# Patient Record
Sex: MALE | Race: WHITE
[De-identification: names, ages, dates, MRNs, and addresses within clinical notes are randomized per-mention and may not be internally consistent; named-entity substitution may affect disease eponyms.]

---

## 2019-12-12 ENCOUNTER — HOSPITAL ENCOUNTER (OUTPATIENT)
Dept: HOSPITAL 47 - EC | Age: 74
Setting detail: OBSERVATION
LOS: 1 days | Discharge: HOME | End: 2019-12-13
Attending: HOSPITALIST | Admitting: HOSPITALIST
Payer: OTHER GOVERNMENT

## 2019-12-12 VITALS — RESPIRATION RATE: 16 BRPM

## 2019-12-12 DIAGNOSIS — Z87.81: ICD-10-CM

## 2019-12-12 DIAGNOSIS — Z79.82: ICD-10-CM

## 2019-12-12 DIAGNOSIS — R53.1: Primary | ICD-10-CM

## 2019-12-12 DIAGNOSIS — I45.10: ICD-10-CM

## 2019-12-12 DIAGNOSIS — R29.700: ICD-10-CM

## 2019-12-12 DIAGNOSIS — R29.810: ICD-10-CM

## 2019-12-12 DIAGNOSIS — Z86.73: ICD-10-CM

## 2019-12-12 DIAGNOSIS — I10: ICD-10-CM

## 2019-12-12 DIAGNOSIS — E78.5: ICD-10-CM

## 2019-12-12 DIAGNOSIS — R47.81: ICD-10-CM

## 2019-12-12 DIAGNOSIS — Z87.891: ICD-10-CM

## 2019-12-12 DIAGNOSIS — Z79.899: ICD-10-CM

## 2019-12-12 DIAGNOSIS — E78.00: ICD-10-CM

## 2019-12-12 LAB
ALBUMIN SERPL-MCNC: 4.2 G/DL (ref 3.5–5)
ALP SERPL-CCNC: 69 U/L (ref 38–126)
ALT SERPL-CCNC: 32 U/L (ref 21–72)
ANION GAP SERPL CALC-SCNC: 6 MMOL/L
APTT BLD: 26.7 SEC (ref 22–30)
AST SERPL-CCNC: 29 U/L (ref 17–59)
BASOPHILS # BLD AUTO: 0 K/UL (ref 0–0.2)
BASOPHILS NFR BLD AUTO: 1 %
BUN SERPL-SCNC: 20 MG/DL (ref 9–20)
CALCIUM SPEC-MCNC: 9.2 MG/DL (ref 8.4–10.2)
CHLORIDE SERPL-SCNC: 107 MMOL/L (ref 98–107)
CO2 SERPL-SCNC: 27 MMOL/L (ref 22–30)
EOSINOPHIL # BLD AUTO: 0.3 K/UL (ref 0–0.7)
EOSINOPHIL NFR BLD AUTO: 4 %
ERYTHROCYTE [DISTWIDTH] IN BLOOD BY AUTOMATED COUNT: 4.76 M/UL (ref 4.3–5.9)
ERYTHROCYTE [DISTWIDTH] IN BLOOD: 12.8 % (ref 11.5–15.5)
GLUCOSE SERPL-MCNC: 116 MG/DL (ref 74–99)
HBA1C MFR BLD: 5.6 % (ref 4–6)
HCT VFR BLD AUTO: 44.3 % (ref 39–53)
HGB BLD-MCNC: 15 GM/DL (ref 13–17.5)
INR PPP: 1 (ref ?–1.2)
LYMPHOCYTES # SPEC AUTO: 1.9 K/UL (ref 1–4.8)
LYMPHOCYTES NFR SPEC AUTO: 32 %
MCH RBC QN AUTO: 31.5 PG (ref 25–35)
MCHC RBC AUTO-ENTMCNC: 33.8 G/DL (ref 31–37)
MCV RBC AUTO: 93.2 FL (ref 80–100)
MONOCYTES # BLD AUTO: 0.5 K/UL (ref 0–1)
MONOCYTES NFR BLD AUTO: 9 %
NEUTROPHILS # BLD AUTO: 3.1 K/UL (ref 1.3–7.7)
NEUTROPHILS NFR BLD AUTO: 51 %
PLATELET # BLD AUTO: 209 K/UL (ref 150–450)
POTASSIUM SERPL-SCNC: 4.2 MMOL/L (ref 3.5–5.1)
PROT SERPL-MCNC: 8 G/DL (ref 6.3–8.2)
PT BLD: 11 SEC (ref 9–12)
SODIUM SERPL-SCNC: 140 MMOL/L (ref 137–145)
WBC # BLD AUTO: 6 K/UL (ref 3.8–10.6)

## 2019-12-12 PROCEDURE — 85025 COMPLETE CBC W/AUTO DIFF WBC: CPT

## 2019-12-12 PROCEDURE — 85610 PROTHROMBIN TIME: CPT

## 2019-12-12 PROCEDURE — 70496 CT ANGIOGRAPHY HEAD: CPT

## 2019-12-12 PROCEDURE — 93306 TTE W/DOPPLER COMPLETE: CPT

## 2019-12-12 PROCEDURE — 80061 LIPID PANEL: CPT

## 2019-12-12 PROCEDURE — 97161 PT EVAL LOW COMPLEX 20 MIN: CPT

## 2019-12-12 PROCEDURE — 99285 EMERGENCY DEPT VISIT HI MDM: CPT

## 2019-12-12 PROCEDURE — 70498 CT ANGIOGRAPHY NECK: CPT

## 2019-12-12 PROCEDURE — 80053 COMPREHEN METABOLIC PANEL: CPT

## 2019-12-12 PROCEDURE — 96360 HYDRATION IV INFUSION INIT: CPT

## 2019-12-12 PROCEDURE — 36415 COLL VENOUS BLD VENIPUNCTURE: CPT

## 2019-12-12 PROCEDURE — 71046 X-RAY EXAM CHEST 2 VIEWS: CPT

## 2019-12-12 PROCEDURE — 70450 CT HEAD/BRAIN W/O DYE: CPT

## 2019-12-12 PROCEDURE — 93005 ELECTROCARDIOGRAM TRACING: CPT

## 2019-12-12 PROCEDURE — 97165 OT EVAL LOW COMPLEX 30 MIN: CPT

## 2019-12-12 PROCEDURE — 83036 HEMOGLOBIN GLYCOSYLATED A1C: CPT

## 2019-12-12 PROCEDURE — 85730 THROMBOPLASTIN TIME PARTIAL: CPT

## 2019-12-12 PROCEDURE — 84484 ASSAY OF TROPONIN QUANT: CPT

## 2019-12-12 PROCEDURE — 70551 MRI BRAIN STEM W/O DYE: CPT

## 2019-12-12 PROCEDURE — 92523 SPEECH SOUND LANG COMPREHEN: CPT

## 2019-12-12 PROCEDURE — 96361 HYDRATE IV INFUSION ADD-ON: CPT

## 2019-12-12 RX ADMIN — FAMOTIDINE SCH: 20 TABLET, FILM COATED ORAL at 10:10

## 2019-12-12 RX ADMIN — DOCUSATE SODIUM SCH: 100 CAPSULE, LIQUID FILLED ORAL at 16:25

## 2019-12-12 RX ADMIN — CEFAZOLIN SCH: 330 INJECTION, POWDER, FOR SOLUTION INTRAMUSCULAR; INTRAVENOUS at 15:41

## 2019-12-12 RX ADMIN — FAMOTIDINE SCH: 20 TABLET, FILM COATED ORAL at 20:04

## 2019-12-12 RX ADMIN — DOCUSATE SODIUM SCH: 100 CAPSULE, LIQUID FILLED ORAL at 10:10

## 2019-12-12 RX ADMIN — DOCUSATE SODIUM SCH: 100 CAPSULE, LIQUID FILLED ORAL at 23:55

## 2019-12-12 RX ADMIN — CEFAZOLIN SCH MLS/HR: 330 INJECTION, POWDER, FOR SOLUTION INTRAMUSCULAR; INTRAVENOUS at 06:02

## 2019-12-12 NOTE — CT
EXAM:

  CT Angiography Head With Intravenous Contrast

 

CLINICAL HISTORY:

  ITS.REASON CT Reason: Neuro deficit, acute, stroke suspected

 

TECHNIQUE:

  Axial computed tomographic angiography images of the head with 

intravenous contrast.  CTDI is 33 mGy and DLP is 413 mGy-cm.  This CT 

exam was performed using one or more of the following dose reduction 

techniques: automated exposure control, adjustment of the mA and/or kV 

according to patient size, and/or use of iterative reconstruction 

technique.

  MIP reconstructed images were created and reviewed.

 

COMPARISON:

  No relevant prior studies available.

 

FINDINGS:

  Right internal carotid artery:  Intracranial segment is patent with no 

significant stenosis.  No aneurysm.

  Right anterior cerebral artery:  No occlusion or significant stenosis.  

No aneurysm.

  Right middle cerebral artery:  No occlusion or significant stenosis.  

No aneurysm.

  Right posterior cerebral artery: Fetal-type PCA.  No occlusion or 

significant stenosis.  No aneurysm.

  Right vertebral artery:  Unremarkable.

 

  Left internal carotid artery:  Intracranial segment is patent with no 

significant stenosis.  No aneurysm.

  Left anterior cerebral artery:  No occlusion or significant stenosis.  

No aneurysm.

  Left middle cerebral artery:  No occlusion or significant stenosis.  No 

aneurysm.

  Left posterior cerebral artery:  No occlusion or significant stenosis.  

No aneurysm.

  Left vertebral artery:  Unremarkable.

 

  Basilar artery:  No occlusion or significant stenosis.  No aneurysm.

 

IMPRESSION:     

  No significant stenosis.

 

_______________________________________________

 

EXAM:

  CT Angiography Neck With Intravenous Contrast

 

CLINICAL HISTORY:

  ITS.REASON CT Reason: Neuro deficit, acute, stroke suspected

 

TECHNIQUE:

  Axial computed tomographic angiography images of the neck with 

intravenous contrast.  CTDI is 33 mGy and DLP is 413 mGy-cm.  This CT 

exam was performed using one or more of the following dose reduction 

techniques: automated exposure control, adjustment of the mA and/or kV 

according to patient size, and/or use of iterative reconstruction 

technique.

  MIP reconstructed images were created and reviewed.

 

COMPARISON:

  No relevant prior studies available.

 

FINDINGS:

 

 VASCULATURE:

  Right common carotid artery:  No significant stenosis.  No dissection 

or occlusion.

  Right internal carotid artery:  Mild atherosclerosis.  Extracranial 

segment is patent with no significant stenosis.  No dissection or 

occlusion.

  Right vertebral artery:  No significant stenosis.  No dissection or 

occlusion.

 

  Left common carotid artery:  No significant stenosis.  No dissection or 

occlusion.

  Left internal carotid artery: Mild atherosclerosis.  Extracranial 

segment is patent with no significant stenosis.  No dissection or 

occlusion.

  Left vertebral artery:  No significant stenosis.  No dissection or 

occlusion.

 

 NECK:

  Bones/joints:  No acute fracture.  No dislocation.

  Soft tissues:  No mass.  Fibronodular changes at the lung apices.

 

 CAROTID STENOSIS REFERENCE USING NASCET CRITERIA:

  % ICA stenosis = (1 - narrowest ICA diameter/diameter of distal 

cervical ICA) x 100.

  Mild - <50% stenosis.

  Moderate - 50-69% stenosis.

  Severe - 70-94% stenosis.

  Near occlusion - 95-99% stenosis.

  Occluded - 100% stenosis.

 

IMPRESSION:     

  No significant stenosis.

## 2019-12-12 NOTE — XR
EXAM:

  XR Chest, 2 Views

 

CLINICAL HISTORY:

  ITS.REASON XR Reason: altered mental status

 

TECHNIQUE:

  Frontal and lateral views of the chest.

 

COMPARISON:

  No relevant prior studies available.

 

FINDINGS:

  Lungs:  No consolidation or mass.

  Pleural space:  No effusion.

  Heart:  No cardiomegaly.

  Bones/joints:  No acute findings.

 

IMPRESSION:     

  No acute cardiopulmonary process.

## 2019-12-12 NOTE — P.HPIM
History of Present Illness


Patient is a pleasant 72-year-old gentleman woke up at 3 AM and found to have 

some weakness in the right side along with tingling numbness later resulted in a

fall.  Patient had a facial droop worse the right side as well which was noticed

by his wife and there is some questionable slurred speech.  Patient's symptoms 

resolved within a few minutes even before he arrived to ER within 15 minutes.  

Patient does admit to hypercholesteremia doesn't take any medications at home at

this time.  Patient was asked to take antihypertensive medications in the past 

presently not in any.





Review of Systems








REVIEW OF SYSTEMS: 


CONSTITUTIONAL: No fever, no malaise, no fatigue. 


HEENT: No recent visual problems or hearing problems. Denied any sore throat. 


CARDIOVASCULAR: No chest pain, orthopnea, PND, no palpitations, no syncope. 


PULMONARY: No shortness of breath, no cough, no hemoptysis. 


GASTROINTESTINAL: No diarrhea, no nausea, no vomiting, no abdominal pain. 


NEUROLOGICAL: As mentioned in HPI


HEMATOLOGICAL: Denies any bleeding or petechiae. 


GENITOURINARY: Denies any burning micturition, frequency, or urgency. 


MUSCULOSKELETAL/RHEUMATOLOGICAL: Denies any joint pain, swelling, or any muscle 

pain. 


ENDOCRINE: Denies any polyuria or polydipsia. 





The rest of the 14-point review of systems is negative.











Past Medical History


Past Medical History: Hypertension


Additional Past Medical History / Comment(s): [place] bilaterally


History of Any Multi-Drug Resistant Organisms: None Reported


Past Surgical History: Orthopedic Surgery


Additional Past Surgical History / Comment(s): L humerus fracture/with hardware,

R shoulder with pins.


Past Anesthesia/Blood Transfusion Reactions: No Reported Reaction


Smoking Status: Former smoker





- Past Family History


  ** Mother


Family Medical History: No Reported History


Additional Family Medical History / Comment(s): Mother was healthy and lived to 

be 91 yrs old.





  ** Father


Additional Family Medical History / Comment(s): Father had yellow fever. He had 

heart valve disease and  in his sleep at the age of 73 yrs.





Medications and Allergies


                                Home Medications











 Medication  Instructions  Recorded  Confirmed  Type


 


Aspirin 81 mg PO DAILY #30 chewable 19  Rx


 


Atorvastatin [Lipitor] 40 mg PO HS #30 tablet 19  Rx








                                    Allergies











Allergy/AdvReac Type Severity Reaction Status Date / Time


 


No Known Allergies Allergy   Verified 19 07:18














Physical Exam


Vitals: 


                                   Vital Signs











  Temp Pulse Resp BP Pulse Ox


 


 19 11:28   72  18  146/92  96


 


 19 10:56   65  18  155/93  97


 


 19 08:33   76  18  161/113  99


 


 19 06:00   80   161/112  97


 


 19 05:45     172/121 


 


 19 05:30   85   178/107  100


 


 19 05:15     170/106 


 


 19 05:00   82   158/112  98


 


 19 04:45   87   158/112  97


 


 19 04:33   87    96


 


 19 04:29  97.4 F L  89  18  170/113  99








                                Intake and Output











 19





 22:59 06:59 14:59


 


Other:   


 


  Weight  68.039 kg 68.039 kg

















PHYSICAL EXAMINATION: 





GENERAL: The patient is alert and oriented x3, not in any acute distress. Well 

developed, well nourished. 


HEENT: Pupils are round and equally reacting to light. EOMI. No scleral icterus.

 No conjunctival pallor. Normocephalic, atraumatic. No pharyngeal erythema. No 

thyromegaly. 


CARDIOVASCULAR: S1 and S2 present. No murmurs, rubs, or gallops. 


PULMONARY: Chest is clear to auscultation, no wheezing or crackles. 


ABDOMEN: Soft, nontender, nondistended, normoactive bowel sounds. No palpable 

organomegaly. 


MUSCULOSKELETAL: No joint swelling or deformity.


EXTREMITIES: No cyanosis, clubbing, or pedal edema. 


NEUROLOGICAL: Gross neurological examination did not reveal any focal deficits. 


SKIN: No rashes. 

















Results


CBC & Chem 7: 


                                 19 04:30





                                 19 04:30


Labs: 


                  Abnormal Lab Results - Last 24 Hours (Table)











  19 Range/Units





  04:30 


 


Glucose  116 H  (74-99)  mg/dL














Thrombosis Risk Factor Assmnt





- Choose All That Apply


Any of the Below Risk Factors Present?: Yes


Other Risk Factors: Yes


Each Risk Factor Represents 2 Points: Age 61-74 years


Other congenital or acquired thrombophilia - If yes, enter type in comment: No


Thrombosis Risk Factor Assessment Total Risk Factor Score: 2


Thrombosis Risk Factor Assessment Level: Low Risk





Assessment and Plan


Plan: 


-Possible TIA involving the left internal capsule, ischemic in nature, involving

 left middle cerebral artery territory patient will undergo MRI.  CT angios did 

not show any significant abnormality, echocardiogram was ordered patient will be

 started on aspirin and will be started on statin 40 mg patient doesn't have any

 symptoms at this time.  If above-mentioned tests are negative patient will be 

discharged on aspirin 81 mg in the atorvastatin 40 mg daily.  Patient doesn't 

smoke.  Quit smoking in .  EKG sinus rhythm


-Hyperlipidemia


-Elevated blood pressure as of now because of his recent TIA patient will not be

 started on any antidepressant medications.  Patient is asked to maintain blood 

pressure diary and counseled regarding blood pressure monitoring.

## 2019-12-12 NOTE — CT
EXAM:

  CT Head Without Intravenous Contrast

 

CLINICAL HISTORY:

  ITS.REASON CT Reason: Neuro deficit, acute, stroke suspected

 

TECHNIQUE:

  Axial computed tomography images of the head/brain without intravenous 

contrast.  CTDI is 97 mGy and DLP is 1095 mGy-cm.  This CT exam was 

performed using one or more of the following dose reduction techniques: 

automated exposure control, adjustment of the mA and/or kV according to 

patient size, and/or use of iterative reconstruction technique.

 

COMPARISON:

  No relevant prior studies available.

 

FINDINGS:

  Brain:  No hemorrhage, herniation, or mass effect.  Chronic 

microvascular ischemic changes.

  Ventricles:  No hydrocephalus.  Age related cerebral volume loss.

  Bones/joints:  Unremarkable.

  Soft tissues:  Unremarkable.

  Sinuses:  Unremarkable.

  Mastoid air cells:  Clear.

 

IMPRESSION:   

  No acute hemorrhage, hydrocephalus, or mass effect.

## 2019-12-12 NOTE — P.CNNES
History of Present Illness


Consult date: 12/12/19


Requesting physician: Rk Lewis


Reason for Consult: TIA


History of Present Illness: 





Patient is a 74-year-old male with no past medical history, went to bed last 

night as usual.  He states that he woke up at 3 AM and try to turn in the bed to

go to the bathroom and was having difficulty turning over.  He was still able to

make it, and then to the bathroom, was fine.  After coming back, he did not feel

comfortable and wanted to get up and walk around, when he got up, he had no 

strength in the right arm and right leg and he fell.  His wife also noticed some

slurred speech, and droopy face on the right side.  She called the ambulance, 

and patient arrived at 4:25 AM.  Patient states that his symptoms started imp

roving while he was at home, and resolves within 15 minutes.  His NIH stroke 

scale was 0.  He was not a candidate for TPA.  Patient's blood pressure was 

170/113, pulse rate 89, temperature 97.4.





Patient's EKG showed sinus bradycardia with incomplete right bundle branch 

block.  Chest x-ray was normal.  CT of the head showed no acute process.  CTA of

the head and neck showed no stenosis or aneurysm.  Chem-20 normal.  CBC normal. 







At present patient feels fine.  Patient denies diabetes hypertension.  Never 

been a heavy smoker.  He quit tobacco in 1970.  He does not take any medication 

at all.





Review of Systems





Unremarkable.  Right knee pain.  Patient states that he did fall about a week 

ago and, sprained his right knee.





Past Medical History


Past Medical History: Hypertension


History of Any Multi-Drug Resistant Organisms: None Reported


Past Surgical History: Orthopedic Surgery


Past Psychological History: No Psychological Hx Reported


Smoking Status: Former smoker


Past Alcohol Use History: Occasional


Past Drug Use History: None Reported





Medications and Allergies


                                Home Medications











 Medication  Instructions  Recorded  Confirmed  Type


 


Aspirin 81 mg PO DAILY #30 chewable 12/12/19  Rx


 


Atorvastatin [Lipitor] 40 mg PO HS #30 tablet 12/12/19  Rx








                                    Allergies











Allergy/AdvReac Type Severity Reaction Status Date / Time


 


No Known Allergies Allergy   Verified 12/12/19 07:18














Physical Examination





- Vital Signs


Vital Signs: 


                                   Vital Signs











  Temp Pulse Resp BP Pulse Ox


 


 12/12/19 11:28   72  18  146/92  96


 


 12/12/19 10:56   65  18  155/93  97


 


 12/12/19 08:33   76  18  161/113  99


 


 12/12/19 06:00   80   161/112  97


 


 12/12/19 05:45     172/121 


 


 12/12/19 05:30   85   178/107  100


 


 12/12/19 05:15     170/106 


 


 12/12/19 05:00   82   158/112  98


 


 12/12/19 04:45   87   158/112  97


 


 12/12/19 04:33   87    96


 


 12/12/19 04:29  97.4 F L  89  18  170/113  99








                                Intake and Output











 12/11/19 12/12/19 12/12/19





 22:59 06:59 14:59


 


Other:   


 


  Weight  68.039 kg 














On examination patient is an elderly  male, in no distress.  There is 

no carotid bruit or murmur.  Peripheral pulses present.  He is alert and awake 

oriented to time place and person.  Speech and language functions are normal.  

Attention and concentration fund of knowledge is adequate.  On cranial nerves 

admission pupils are round and reactive to light, visual fields are full on 

confrontation.  Extraocular muscles intact with no nystagmus.  Face is symmetric

 and tongue protrudes the midline.  Palatal elevation and sensation normal.  On 

muscle strength testing there is no pronator drift and the strength is normal in

 arms and legs distally and proximally.  Reflexes symmetric 2+ and plantars 

downgoing.  Sensory to touch is equal with no neglect.  No ataxia for 

finger-to-nose testing.  Tone and bulk of muscles normal.





Results





- Laboratory Findings


CBC and BMP: 


                                 12/12/19 04:30





                                 12/12/19 04:30


Abnormal Lab Findings: 


                                  Abnormal Labs











  12/12/19





  04:30


 


Glucose  116 H














Assessment and Plan


Assessment: 





* 74-year-old male, presenting with TIA manifesting with transient right arm and

   leg weakness, slurred speech and facial droop, that resolved in around 15 

  minutes.


* Hypertension


Plan: 





* Patient has been started on aspirin 325 mg daily.  Patient was recommended to 

  take full aspirin for 3 months and thereafter can decrease to low-dose aspirin

   81 mg daily.


* Fasting lipid panel


* Patient undergoing MRI of the brain and a 2-D echo.


* Hemoglobin A1c.


* If above tests normal, then patient can be discharged on aspirin and statins. 

   Follow-up with primary physician to address hypertension.

## 2019-12-12 NOTE — ED
Neuro HPI





- General


Chief Complaint: Neuro Symptoms/Deficit


Stated Complaint: TIA


Time Seen by Provider: 12/12/19 04:28


Source: patient, EMS


Mode of arrival: EMS


Limitations: no limitations





- History of Present Illness


Is the patient presenting with stroke symptoms?: Yes


Last Known Well Date: 12/11/19


Last Known Well Time: 20:30


-: hour(s)


Initial Comments: 





This patient is 74-year-old man who presents with concern that he may have had a

stroke.  The patient had gone to bed feeling well around 8:30.  Patient had 

gotten up to use the bathroom around 3:30, and when he tried to get out of bed 

he felt like his right side was numb and weak.  He ended up falling to the 

floor.  The patient's wife checked him and saw that he had right facial droop 

and right arm weakness.  She states that his speech also was not right.  The 

called EMS to bring him here.  The patient states that his symptoms had 

subsequently resolved.  Patient states that he feels back to baseline now.  EMS 

did note that the patient was moderately hypertensive.  The patient states that 

he does have history of hypertension and his blood pressure usually runs around 

140/90.  He states that he has not taken any treatment for this.


Location: right face, dysarthria, right arm


History of same: No


Place: home


Severity: severe


Quality: weak, numb


Improves With: time


Worsens With: none


On Anticoagulants: No


Context: sudden onset


Associated Symptoms: denies other symptoms


Treatments Prior to Arrival: none





- Related Data


Allergies/Adverse Reactions: 


                                    Allergies











Allergy/AdvReac Type Severity Reaction Status Date / Time


 


No Known Allergies Allergy   Verified 12/12/19 05:03














Review of Systems


ROS Statement: 


Those systems with pertinent positive or pertinent negative responses have been 

documented in the HPI.





ROS Other: All systems not noted in ROS Statement are negative.


Constitutional: Denies: fever, chills


Eyes: Denies: vision change


Respiratory: Denies: cough, dyspnea


Cardiovascular: Denies: chest pain, palpitations


Gastrointestinal: Denies: abdominal pain, vomiting, diarrhea


Genitourinary: Denies: dysuria, hematuria


Musculoskeletal: Denies: back pain


Skin: Denies: rash


Neurological: Reports: weakness (Right-sided), numbness.  Denies: headache, 

confusion, abnormal gait





General Exam


Limitations: no limitations


General appearance: alert, in no apparent distress


Head exam: Present: atraumatic, normocephalic


Eye exam: Present: normal appearance, PERRL, EOMI.  Absent: scleral icterus, 

conjunctival injection, nystagmus


ENT exam: Present: normal oropharynx


Neck exam: Present: normal inspection, full ROM


Respiratory exam: Present: normal lung sounds bilaterally.  Absent: respiratory 

distress, wheezes, rales, rhonchi, stridor


Cardiovascular Exam: Present: regular rate, normal rhythm, normal heart sounds. 

Absent: systolic murmur, diastolic murmur, rubs, gallop


GI/Abdominal exam: Present: soft.  Absent: distended, tenderness, guarding, 

rebound


Extremities exam: Present: normal inspection, normal capillary refill.  Absent: 

pedal edema, calf tenderness


Back exam: Present: normal inspection.  Absent: CVA tenderness (R), CVA 

tenderness (L)


Neurological exam: Present: alert, oriented X3, CN II-XII intact.  Absent: motor

sensory deficit


Skin exam: Present: warm, dry, intact, normal color.  Absent: rash





Stroke MDM





- Lab Data


Result diagrams: 


                                 12/12/19 04:30





                                   Lab Results











  12/12/19 12/12/19 Range/Units





  04:30 04:30 


 


WBC  6.0   (3.8-10.6)  k/uL


 


RBC  4.76   (4.30-5.90)  m/uL


 


Hgb  15.0   (13.0-17.5)  gm/dL


 


Hct  44.3   (39.0-53.0)  %


 


MCV  93.2   (80.0-100.0)  fL


 


MCH  31.5   (25.0-35.0)  pg


 


MCHC  33.8   (31.0-37.0)  g/dL


 


RDW  12.8   (11.5-15.5)  %


 


Plt Count  209   (150-450)  k/uL


 


Neutrophils %  51   %


 


Lymphocytes %  32   %


 


Monocytes %  9   %


 


Eosinophils %  4   %


 


Basophils %  1   %


 


Neutrophils #  3.1   (1.3-7.7)  k/uL


 


Lymphocytes #  1.9   (1.0-4.8)  k/uL


 


Monocytes #  0.5   (0-1.0)  k/uL


 


Eosinophils #  0.3   (0-0.7)  k/uL


 


Basophils #  0.0   (0-0.2)  k/uL


 


PT   11.0  (9.0-12.0)  sec


 


INR   1.0  (<1.2)  


 


APTT   26.7  (22.0-30.0)  sec














- EKG Data


-: EKG Interpreted by Me


EKG shows normal: sinus rhythm, axis (Normal), intervals (Normal), QRS complexes

(Incomplete right bundle branch block), ST-T waves (Normal)


Rate: normal (Rate 83 bpm)





Past Medical History


Past Medical History: Hypertension


History of Any Multi-Drug Resistant Organisms: None Reported


Past Surgical History: Orthopedic Surgery


Past Psychological History: No Psychological Hx Reported


Smoking Status: Former smoker


Past Alcohol Use History: Occasional


Past Drug Use History: None Reported





Course


                                   Vital Signs











  12/12/19





  04:29


 


Temperature 97.4 F L


 


Pulse Rate 89


 


Respiratory 18





Rate 


 


Blood Pressure 170/113


 


O2 Sat by Pulse 99





Oximetry 














Critical Care Time


Critical Care Time: Yes (35 minutes)





Disposition


Clinical Impression: 


 Transient cerebral ischemia, Hypertension





Disposition: ADMITTED AS IP TO THIS HOSP


Condition: Fair


Is patient prescribed a controlled substance at d/c from ED?: No


Referrals: 


[facility] [Primary Care Provider] - 1-2 days

## 2019-12-13 VITALS — SYSTOLIC BLOOD PRESSURE: 133 MMHG | HEART RATE: 70 BPM | DIASTOLIC BLOOD PRESSURE: 83 MMHG | TEMPERATURE: 98.2 F

## 2019-12-13 LAB
CHOLEST SERPL-MCNC: 214 MG/DL (ref ?–200)
HDLC SERPL-MCNC: 32 MG/DL (ref 40–60)
LDLC SERPL CALC-MCNC: 151 MG/DL (ref 0–99)
TRIGL SERPL-MCNC: 154 MG/DL (ref ?–150)

## 2019-12-13 RX ADMIN — FAMOTIDINE SCH: 20 TABLET, FILM COATED ORAL at 08:17

## 2019-12-13 RX ADMIN — CEFAZOLIN SCH: 330 INJECTION, POWDER, FOR SOLUTION INTRAMUSCULAR; INTRAVENOUS at 13:25

## 2019-12-13 RX ADMIN — CEFAZOLIN SCH: 330 INJECTION, POWDER, FOR SOLUTION INTRAMUSCULAR; INTRAVENOUS at 02:36

## 2019-12-13 RX ADMIN — DOCUSATE SODIUM SCH: 100 CAPSULE, LIQUID FILLED ORAL at 08:17

## 2019-12-13 NOTE — MR
MR brain without contrast

 

HISTORY: Neuro deficit, acute stroke suspected

 

Multiplanar multisequence imaging through the brain

 

Correlation to CT brain 12/12/2019

 

There is no restricted diffusion. Cortical atrophy is likely age-related. There is no hemorrhage or h
ydrocephalus. The corpus callosum, pituitary, cervical medullary junction, cerebellopontine angles ar
e normal. There are expected vascular flow voids. Inflammatory change present in the ethmoid air cell
s, mucosal thickening in the maxillary sinus. The orbits show symmetric appearance. Confluent and sca
ttered hyperintensities are present in the periventricular, subcortical, juxtacortical and pericallos
al white matter. There are greater than 50 lesions.

 

IMPRESSION: Age-related changes of atrophy and probable chronic small vessel ischemia. No acute, suba
cute cerebrovascular accident evident. Mild sinus disease.

## 2019-12-13 NOTE — P.PN
Subjective


Progress Note Date: 12/13/19





Patient offers no new complaints.  patient feels fine.  Denies headache, any new

neurological symptoms.





Objective





- Vital Signs


Vital signs: 


                                   Vital Signs











Temp  98.2 F   12/13/19 12:00


 


Pulse  70   12/13/19 12:00


 


Resp  16   12/13/19 12:00


 


BP  133/83   12/13/19 12:00


 


Pulse Ox  96   12/13/19 12:00








                                 Intake & Output











 12/12/19 12/13/19 12/13/19





 18:59 06:59 18:59


 


Intake Total 1320  400


 


Balance 1320  400


 


Weight 68.039 kg 67.9 kg 


 


Intake:   


 


  Intake, IV Titration 600  





  Amount   


 


    Sodium Chloride 0.9% 1, 600  





    000 ml @ 100 mls/hr IV .   





    Q10H [facility] Rx#:[id number]   


 


  Oral 720  400


 


Other:   


 


  # Voids   1














- Exam





patient's mental status, speech and language functions are normal.  Cranial 

nerves are normal.  Muscle strength and gait is normal.





- Labs


CBC & Chem 7: 


                                 12/12/19 04:30





                                 12/12/19 04:30


Labs: 


                  Abnormal Lab Results - Last 24 Hours (Table)











  12/12/19 Range/Units





  04:30 


 


Triglycerides  154 H  (<150)  mg/dL


 


Cholesterol  214 H  (<200)  mg/dL


 


LDL Cholesterol, Calc  151 H  (0-99)  mg/dL


 


HDL Cholesterol  32 L  (40-60)  mg/dL














Assessment and Plan


Assessment: 





* 74-year-old male, presenting with TIA manifesting with transient right arm and

  leg weakness, slurred speech and facial droop, that resolved in around 15 

  minutes.


* Hypertension


* Hyperlipidemia


Plan: 





* Patient has been started on aspirin 325 mg daily.  Patient was recommended to 

  take full aspirin for 3 months and thereafter can decrease to low-dose aspirin

  81 mg daily.


* Fasting lipid panel showed cholesterol 214, , HDL 32, triglycerides 

  154.  Hemoglobin A1c 5.6.  Agree with starting Lipitor for hyperlipidemia.


* MRI of the brain showed no acute stroke.  Showed some small vessel ischemic 

  disease.


* 2-D echo Showed normal sinus rhythm.  EF is 55-60%.  Left atrial size is 

  normal.


* Hemoglobin A1c 5.6.


* Patient's blood pressure is 133/83.  Patient is neurologically clear for 

  discharge.

## 2019-12-13 NOTE — ECHOF
Referral Reason:tia



MEASUREMENTS

--------

HEIGHT: 170.2 cm

WEIGHT: 68.0 kg

BP: 155/93

RVIDd:   3.4 cm     (< 3.3)

IVSd:   1.3 cm     (0.6 - 1.1)

LVIDd:   3.4 cm     (3.9 - 5.3)

LVPWd:   1.6 cm     (0.6 - 1.1)

IVSs:   1.6 cm

LVIDs:   2.5 cm

LVPWs:   1.6 cm

LA Diam:   3.3 cm     (2.7 - 3.8)

LAESV Index (A-L):   27.11 ml/m

Ao Diam:   2.8 cm     (2.0 - 3.7)

AV Cusp:   1.6 cm     (1.5 - 2.6)

LA Diam:   3.5 cm     (2.7 - 3.8)

MV EXCURSION:   19.132 mm     (> 18.000)

MV EF SLOPE:   106 mm/s     (70 - 150)

EPSS:   0.2 cm

MV E Ramez:   0.67 m/s

MV DecT:   209 ms

MV A Ramez:   0.64 m/s

MV E/A Ratio:   1.03 

RAP:   5.00 mmHg

RVSP:   15.88 mmHg







FINDINGS

--------

Sinus rhythm.

This was a technically good study.

The left ventricular size is normal.   There is mild concentric left ventricular hypertrophy.   Overa
ll left ventricular systolic function is normal with, an EF between 55 - 60 %.

The right ventricle is normal in size.

The left atrial size is normal.    Normal LA  size by volume 22+/-6 ml/m2.

The right atrial size is normal.

There is mild aortic valve sclerosis.   There is no evidence of aortic regurgitation.

Mild mitral annular calcification present.   Mild mitral regurgitation is present.

Mild tricuspid regurgitation present.   Right ventricular systolic pressure is normal at < 35 mmHg.  
 There is no evidence of pulmonary hypertension.

There is no pulmonic regurgitation present.

The aortic root size is normal.

There is no pericardial effusion.



CONCLUSIONS

--------

1. Sinus rhythm.

2. This was a technically good study.

3. The left ventricular size is normal.

4. There is mild concentric left ventricular hypertrophy.

5. Overall left ventricular systolic function is normal with, an EF between 55 - 60 %.

6. The right ventricle is normal in size.

7. The left atrial size is normal.

8. Normal LA size by volume 22+/-6 ml/m2.

9. The right atrial size is normal.

10. There is mild aortic valve sclerosis.

11. Mild mitral annular calcification present.

12. Mild mitral regurgitation is present.

13. Mild tricuspid regurgitation present.

14. Right ventricular systolic pressure is normal at < 35 mmHg.

15. There is no evidence of pulmonary hypertension.

16. There is no pulmonic regurgitation present.

17. The aortic root size is normal.

18. There is no pericardial effusion.





SONOGRAPHER: Stacy New RDCS

## 2022-05-23 ENCOUNTER — HOSPITAL ENCOUNTER (OUTPATIENT)
Dept: HOSPITAL 47 - RADCTMAIN | Age: 77
Discharge: HOME | End: 2022-05-23
Attending: INTERNAL MEDICINE
Payer: MEDICARE

## 2022-05-23 DIAGNOSIS — N62: ICD-10-CM

## 2022-05-23 DIAGNOSIS — R91.8: ICD-10-CM

## 2022-05-23 DIAGNOSIS — J84.10: ICD-10-CM

## 2022-05-23 DIAGNOSIS — I25.10: Primary | ICD-10-CM

## 2022-05-23 LAB — BUN SERPL-SCNC: 22 MG/DL (ref 9–20)

## 2022-05-23 PROCEDURE — 36415 COLL VENOUS BLD VENIPUNCTURE: CPT

## 2022-05-23 PROCEDURE — 71275 CT ANGIOGRAPHY CHEST: CPT

## 2022-05-23 PROCEDURE — 82565 ASSAY OF CREATININE: CPT

## 2022-05-23 PROCEDURE — 84520 ASSAY OF UREA NITROGEN: CPT

## 2022-05-23 NOTE — CT
EXAMINATION TYPE: CT angio chest

 

DATE OF EXAM: 5/23/2022

 

COMPARISON: NONE

 

HISTORY: Follow up for PE

 

CT DLP: 217.3 mGy.cm. Automated Exposure Control for Dose Reduction was Utilized.

 

TECHNIQUE AND CONTRAST: 

CTA scan of the thorax is performed with IV Contrast, patient injected with 80ML mL of Isovue 370, pu
lmonary angiogram protocol.   MIP Images are created on an independent workstation and reviewed. 

 

FINDINGS:

No definite filling defect within the pulmonary trunk, main pulmonary arteries, lobar, segmental and 
proximal subsegmental branches to suggest pulmonary embolism. Distal subsegmental branches are subopt
imally assessed. The pulmonary trunk measures 3 cm. Scattered arterial and coronary atherosclerotic c
alcifications. No gross cardiomegaly. No pericardial effusion.

 

Scattered bilateral pulmonary areas of groundglass opacities, peripheral reticulations and mild fibro
tic changes with could represent sequela of previous infection, please correlate clinically. Thick bi
lateral apical pulmonary fibrotic changes more on the right side. Patent trachea and main bronchi. No
 pleural effusion. Scattered subcentimeter mediastinal and hilar lymph nodes, nonspecific. Small bila
teral gynecomastia changes. Grossly unremarkable upper abdomen. Degenerative changes of the right gle
nohumeral articulation.

 

IMPRESSION: 

No major or central pulmonary embolism. Nonspecific pulmonary changes as described above which could 
represent sequela of previous pulmonary infection, please correlate clinically and with pulmonary fun
ction tests. Other incidental findings as described above.